# Patient Record
Sex: FEMALE | Race: ASIAN | NOT HISPANIC OR LATINO | URBAN - METROPOLITAN AREA
[De-identification: names, ages, dates, MRNs, and addresses within clinical notes are randomized per-mention and may not be internally consistent; named-entity substitution may affect disease eponyms.]

---

## 2019-05-29 ENCOUNTER — EMERGENCY (EMERGENCY)
Facility: HOSPITAL | Age: 59
LOS: 0 days | Discharge: HOME | End: 2019-05-29
Attending: EMERGENCY MEDICINE
Payer: COMMERCIAL

## 2019-05-29 VITALS
RESPIRATION RATE: 16 BRPM | SYSTOLIC BLOOD PRESSURE: 160 MMHG | WEIGHT: 141.98 LBS | TEMPERATURE: 99 F | HEIGHT: 62 IN | HEART RATE: 78 BPM | DIASTOLIC BLOOD PRESSURE: 88 MMHG | OXYGEN SATURATION: 98 %

## 2019-05-29 VITALS
TEMPERATURE: 97 F | DIASTOLIC BLOOD PRESSURE: 81 MMHG | HEART RATE: 69 BPM | SYSTOLIC BLOOD PRESSURE: 146 MMHG | OXYGEN SATURATION: 99 % | RESPIRATION RATE: 16 BRPM
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
## 2019-05-29 DIAGNOSIS — S62.316A DISPLACED FRACTURE OF BASE OF FIFTH METACARPAL BONE, RIGHT HAND, INITIAL ENCOUNTER FOR CLOSED FRACTURE: ICD-10-CM
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
## 2019-05-29 DIAGNOSIS — M79.641 PAIN IN RIGHT HAND: ICD-10-CM

## 2019-05-29 DIAGNOSIS — E11.9 TYPE 2 DIABETES MELLITUS WITHOUT COMPLICATIONS: ICD-10-CM

## 2019-05-29 PROCEDURE — 73030 X-RAY EXAM OF SHOULDER: CPT | Mod: 26,RT

## 2019-05-29 PROCEDURE — 99284 EMERGENCY DEPT VISIT MOD MDM: CPT | Mod: 25

## 2019-05-29 PROCEDURE — 26600 TREAT METACARPAL FRACTURE: CPT | Mod: 54

## 2019-05-29 PROCEDURE — 73130 X-RAY EXAM OF HAND: CPT | Mod: 26,RT

## 2019-05-29 RX ORDER — ACETAMINOPHEN 500 MG
975 TABLET ORAL ONCE
Refills: 0 | Status: COMPLETED | OUTPATIENT
Start: 2019-05-29 | End: 2019-05-29

## 2019-05-29 RX ADMIN — Medication 975 MILLIGRAM(S): at 07:37

## 2019-05-29 RX ADMIN — Medication 975 MILLIGRAM(S): at 08:10

## 2019-05-29 NOTE — ED ADULT NURSE REASSESSMENT NOTE - NS ED NURSE REASSESS COMMENT FT1
pt seen and re-evaluated by ED attending d/c ready in stable condition, splint applied to the rt hand by MD pt instructed to f/.u with Dr Uribe for f/u care.

## 2019-05-29 NOTE — ED PROVIDER NOTE - CARE PROVIDER_API CALL
Amanda Uribe (MD)  Plastic Surgery  [address] [place] [address], [address]  [place], [place] [id number]  Phone: [phone]  Fax: [phone]  Follow Up Time:

## 2019-05-29 NOTE — ED PROVIDER NOTE - NSFOLLOWUPINSTRUCTIONS_ED_ALL_ED_FT
You have a FRACTURE/BROKEN BONE at the base of the 5th METACARPAL.    This FRACTURE will heal on its own.    Keep the SPLINT completely dry.    Call the HAND DOCTOR for a follow up appointment.

## 2019-05-29 NOTE — ED PROVIDER NOTE - PHYSICAL EXAMINATION
Gen: Alert, NAD  Head: NC, AT   Eyes: PERRL, EOMI, normal lids/conjunctiva  ENT: normal hearing, patent oropharynx without erythema/exudate, uvula midline  Neck: supple, no tenderness, Trachea midline  Pulm: Bilateral BS, normal resp effort, no wheeze/stridor/retractions  CV: RRR, no M/R/G, 2+ radial and dp pulses bl, no edema  Abd: soft, NT/ND, +BS, no hepatosplenomegaly  Mskel: soft tissue swelling right mid hand with ttp david over mcps 3,4,5. no ctl spine ttp.   Skin: no rash, no bruising   Neuro: AAOx3, no sensory/motor deficits, CN 2-12 intact

## 2019-05-29 NOTE — ED PROVIDER NOTE - OBJECTIVE STATEMENT
Pertinent PMH/PSH/FHx/SHx and Review of Systems contained within:  59F hx of dm pw right hand pain and right shoulder pain sp mechanical fall. pt tried to grab handrail and then fooshed onto the right. notes pain in the mid hand and anterior right shoulder. this happened yesterday but it hurts more today than yesterday. there is no numbness, tingling and weakness. there is some soft tissue swelling. she is able to move the fingers, wrist, elbow without any pain. no head injury. no loc. no dizziness, blurred vision, nausea, vomiting  Fh and Sh not otherwise contributory  ROS otherwise negative

## 2019-05-29 NOTE — ED PROVIDER NOTE - CLINICAL SUMMARY MEDICAL DECISION MAKING FREE TEXT BOX
hand pain - needs xray. hand pain - needs xray. xray shows chip fx base of the fifth. splint applied. hand follow up

## 2019-05-29 NOTE — ED ADULT NURSE NOTE - OBJECTIVE STATEMENT
59F aaox4 ambulatory reports pain in the rt hand/palm s/p fall yesterday. Patient reports she catch the fall with her rt hand, noted bruises on the rt palm and tenderness, also c/o rt shoulder pain.

## 2019-05-29 NOTE — ED ADULT TRIAGE NOTE - CHIEF COMPLAINT QUOTE
As per son pt complains of left hand swelling and pain 8/10 sharp and right shoulder pain 7/10 sharp. Son states pt used her hand to brace fall

## 2019-05-29 NOTE — ED PROVIDER NOTE - CARE PLAN
Principal Discharge DX:	Closed displaced fracture of base of fifth metacarpal bone of right hand, initial encounter

## 2020-06-08 ENCOUNTER — OUTPATIENT (OUTPATIENT)
Dept: OUTPATIENT SERVICES | Facility: HOSPITAL | Age: 60
LOS: 1 days | End: 2020-06-08

## 2020-06-08 LAB — SARS-COV-2 RNA SPEC QL NAA+PROBE: SIGNIFICANT CHANGE UP

## 2025-05-06 ENCOUNTER — EMERGENCY (EMERGENCY)
Facility: HOSPITAL | Age: 65
LOS: 1 days | End: 2025-05-06
Attending: EMERGENCY MEDICINE | Admitting: EMERGENCY MEDICINE
Payer: MEDICAID

## 2025-05-06 VITALS
DIASTOLIC BLOOD PRESSURE: 80 MMHG | RESPIRATION RATE: 20 BRPM | TEMPERATURE: 98 F | HEART RATE: 72 BPM | WEIGHT: 136.91 LBS | SYSTOLIC BLOOD PRESSURE: 137 MMHG | OXYGEN SATURATION: 99 %

## 2025-05-06 PROCEDURE — 99283 EMERGENCY DEPT VISIT LOW MDM: CPT

## 2025-05-06 NOTE — ED PROVIDER NOTE - ATTENDING CONTRIBUTION TO CARE
Patient is a 65-year-old female with a history of type 2 diabetes mellitus, hypertension, hyperlipidemia, here with her son for evaluation of puffy eyes, swollen face, periodically itchy face in the ears and neck.  Patient reports that symptoms started 3-4 days ago after going for a morning walk.   accordingly, patient states that when she wakes up in the morning her eyes are swollen shut and her face is puffy.  She states that she periodically gets an itchy rash which turns her skin red.  She last took Claritin yesterday afternoon.  She does report itchy eyes. She has since been taking Claritin with minimal relief.  She denies any sore throat, runny nose, cough, shortness of breath.  She denies any rash on her arms or legs.  She denies any new detergents or lotions.  She does not have any other known allergies.  Patient son is at bedside, assisting in providing history.  Patient speaks Akua and Martina.  OptiNose  was used to confirm history and discuss management with her.  , [id number], [email or web address] was used.     VS noted  Gen. no acute distress, Non toxic   HEENT:  PERRLA, EOMI, mmm, pharynx is normal, no stridor, airway intact, bilateral TM normal  Lungs: CTAB/L no C/ W /R   CVS: RRR   Abd; Soft non tender, non distended   Ext: no edema  Skin: no rash  Neuro AAOx3 non focal clear speech  a/p:   Allergies–on exam, patient has no focal findings.  She has clear skin without a rash.  She has no pain in her face.  She denies any fevers, chills, nausea, vomiting or diarrhea.  Her lungs are clear to auscultation.  Per son at bedside, patient recently had blood work that showed some decrease in her kidney function.  Patient has been hydrating herself more.  She denies any difficulty urinating or decreased urination.  She denies any lower extremity swelling.  Patient has no chest pain or shortness of breath.  At this time, there  are no symptoms to address.  Patient advised to continue taking Claritin on a daily basis and to add allergy eyedrops (Pataday).  Return precautions discussed in great detail including fevers, swollen eyes, difficulty swallowing, shortness of breath.  I spoke to ward lisa and they will email the allergy department to help patient get an appointment.  I informed patient that they should expect a phone call within 24 to 48 hours to help him get an appointment with an allergist.  All questions answered.  At this time there is no further indication for a workup in the emergency department.  - Emily BAKER

## 2025-05-06 NOTE — ED PROVIDER NOTE - NSFOLLOWUPINSTRUCTIONS_ED_ALL_ED_FT
- You were seen in the emergency department today for  swelling and itching around the eyes.  We suspect that your symptoms may be due to allergies, occasionally they can be seasonal and can happen during this time a year.  We suggest that you take a Claritin over-the-counter every day in order to help with your symptoms and for the itchy eyes you can use the over-the-counter medication Pataday.   Please follow medication instructions and warnings.    -  it is difficult to know what exactly you are allergic to until further tests are performed.  You are referred to a special doctor for allergies call and an allergist.  You will receive a phone call for this appointment by our coordinator in order to schedule this but if you do not a phone call you can call is also listed below in this packet.      - Follow up with your doctor in 1 week - bring copies of your results if you were given. If you are supposed  to follow up with a specialist, please bring your results with you as well.     - Return to the ED for any new, worsening, or concerning symptoms to you    - Continue all prescribed medications.    - Take ibuprofen/tylenol as directed as needed for pain.     - Rest and keep yourself hydrated with fluids.      Allergic Reaction    An allergic reaction is an abnormal reaction to a substance (allergen) by the body's defense system. Common allergens include medicines, food, insect bites or stings, and blood products. The body releases certain proteins into the blood that can cause a variety of symptoms such as an itchy rash, wheezing, swelling of the face/lips/tongue/throat, abdominal pain, nausea or vomiting.     SEEK IMMEDIATE MEDICAL CARE IF YOU HAVE ANY OF THE FOLLOWING SYMPTOMS: allergic reaction severe enough that required you to use epinephrine, tightness in your chest, swelling around your lips/tongue/throat, abdominal pain, vomiting or diarrhea, or lightheadedness/dizziness. These symptoms may represent a serious problem that is an emergency. Do not wait to see if the symptoms will go away. Use your auto-injector pen or anaphylaxis kit as you have been instructed. Call 911 and do not drive yourself to the hospital.

## 2025-05-06 NOTE — ED PROVIDER NOTE - NSFOLLOWUPCLINICS_GEN_ALL_ED_FT
[facility]  Allergy  [address] [place] [address]  Phone: [phone]  Fax:     [facility]  Allergy  [address]  [place] [address]  Phone: [phone]  Fax: [phone]

## 2025-05-06 NOTE — ED ADULT TRIAGE NOTE - CHIEF COMPLAINT QUOTE
Patient complains of facial swelling around right eye and forehead for the past 4 days, denies any pain at this time. Patient denies any CP or SOB, respirations equal and unlabored on room air, no drooling noted. Patient also reports rash to neck and face that comes up at nights, no rash noted in triage. FS: 153 pmhx: HTN, DM

## 2025-05-06 NOTE — ED PROVIDER NOTE - OBJECTIVE STATEMENT
65-year-old female past medical history diabetes mellitus, hypertension, hyperlipidemia, presents today for 3 to 4 days worth of pruritus bilaterally but more predominant on the right side proximal to her eyes, first noticed it when she was walking outside by a lot of  plants, associated with a  redness on her skin afterwards for roughly the initial day.  denies any vision changes but notes that her eyes may be itchy, denies rhinorrhea, congestion, ear discharge, ear fullness, difficulty with breathing, sore throat, difficulty of eating, shortness of breath, chest pain, abdominal pain.  denies any new exposures to medicines, plants, animals, drugs, detergents, blankets      Translation services were used.

## 2025-05-06 NOTE — ED PROVIDER NOTE - PATIENT PORTAL LINK FT
You can access the FollowMyHealth Patient Portal offered by [facility] by registering at the following website: [email or web address]. By joining [facility]’s FollowMyHealth portal, you will also be able to view your health information using other applications (apps) compatible with our system.

## 2025-05-06 NOTE — ED PROVIDER NOTE - CLINICAL SUMMARY MEDICAL DECISION MAKING FREE TEXT BOX
65-year-old female past medical history diabetes mellitus, hypertension, hyperlipidemia, presents today for 3 to 4 days worth of pruritus bilaterally but more predominant on the right side proximal to her eyes, first noticed it when she was walking outside by a lot of  plants, associated with a  redness on her skin afterwards for roughly the initial day.

## 2025-05-06 NOTE — ED PROVIDER NOTE - PHYSICAL EXAMINATION
Physical Exam:    Gen: NAD, AOx3  Head: NCAT,  no swelling around the eyes, no redness of the conjunctiva  HEENT: EOMI, PEERLA, pink and moist mucous membranes.  gray and translucent tympanic membranes bilaterally  Lung: CTAB, no respiratory distress, no wheezes/rhonchi/rales B/L  CV: RRR, no murmurs, rubs or gallops  Abd: soft, NT, ND, no guarding, no rigidity, no rebound tenderness  MSK: no visible deformities  Neuro: No focal sensory or motor deficits. Sensation intact to light touch all extremities.  Skin: Warm, well perfused, no rash, no leg swelling  Psych: normal affect, calm